# Patient Record
Sex: FEMALE | Race: WHITE | NOT HISPANIC OR LATINO | URBAN - METROPOLITAN AREA
[De-identification: names, ages, dates, MRNs, and addresses within clinical notes are randomized per-mention and may not be internally consistent; named-entity substitution may affect disease eponyms.]

---

## 2018-08-30 ENCOUNTER — OFFICE VISIT CONVERTED (OUTPATIENT)
Dept: OTHER | Facility: HOSPITAL | Age: 73
End: 2018-08-30
Attending: NURSE PRACTITIONER

## 2018-08-30 ENCOUNTER — CONVERSION ENCOUNTER (OUTPATIENT)
Dept: OTHER | Facility: HOSPITAL | Age: 73
End: 2018-08-30

## 2018-09-20 ENCOUNTER — OFFICE VISIT CONVERTED (OUTPATIENT)
Dept: OTHER | Facility: HOSPITAL | Age: 73
End: 2018-09-20
Attending: NURSE PRACTITIONER

## 2019-03-18 ENCOUNTER — OFFICE VISIT CONVERTED (OUTPATIENT)
Dept: OTHER | Facility: HOSPITAL | Age: 74
End: 2019-03-18
Attending: NURSE PRACTITIONER

## 2019-03-18 ENCOUNTER — CONVERSION ENCOUNTER (OUTPATIENT)
Dept: OTHER | Facility: HOSPITAL | Age: 74
End: 2019-03-18

## 2019-03-18 ENCOUNTER — HOSPITAL ENCOUNTER (OUTPATIENT)
Dept: OTHER | Facility: HOSPITAL | Age: 74
Discharge: HOME | End: 2019-03-18
Attending: NURSE PRACTITIONER

## 2019-04-09 ENCOUNTER — CONVERSION ENCOUNTER (OUTPATIENT)
Dept: OTHER | Facility: HOSPITAL | Age: 74
End: 2019-04-09

## 2019-04-09 ENCOUNTER — HOSPITAL ENCOUNTER (OUTPATIENT)
Dept: OTHER | Facility: HOSPITAL | Age: 74
Discharge: HOME | End: 2019-04-09
Attending: NURSE PRACTITIONER

## 2019-04-11 LAB
CONV HEPATITIS C AB WITH REFLEX TO CONFIRMATION: <0.1 S/CO RATIO (ref 0–0.9)
CONV HEPATITIS COMMENT: NORMAL

## 2019-04-24 ENCOUNTER — CONVERSION ENCOUNTER (OUTPATIENT)
Dept: OTHER | Facility: HOSPITAL | Age: 74
End: 2019-04-24

## 2019-04-24 ENCOUNTER — OFFICE VISIT CONVERTED (OUTPATIENT)
Dept: OTHER | Facility: HOSPITAL | Age: 74
End: 2019-04-24
Attending: NURSE PRACTITIONER

## 2019-06-26 ENCOUNTER — OFFICE VISIT CONVERTED (OUTPATIENT)
Dept: OTHER | Facility: HOSPITAL | Age: 74
End: 2019-06-26
Attending: NURSE PRACTITIONER

## 2019-06-26 ENCOUNTER — CONVERSION ENCOUNTER (OUTPATIENT)
Dept: OTHER | Facility: HOSPITAL | Age: 74
End: 2019-06-26

## 2019-07-11 ENCOUNTER — CONVERSION ENCOUNTER (OUTPATIENT)
Dept: SURGERY | Facility: CLINIC | Age: 74
End: 2019-07-11

## 2019-10-16 ENCOUNTER — HOSPITAL ENCOUNTER (OUTPATIENT)
Dept: OTHER | Facility: HOSPITAL | Age: 74
Discharge: HOME | End: 2019-10-16
Attending: NURSE PRACTITIONER

## 2019-10-16 ENCOUNTER — OFFICE VISIT CONVERTED (OUTPATIENT)
Dept: OTHER | Facility: HOSPITAL | Age: 74
End: 2019-10-16
Attending: NURSE PRACTITIONER

## 2019-10-16 ENCOUNTER — CONVERSION ENCOUNTER (OUTPATIENT)
Dept: OTHER | Facility: HOSPITAL | Age: 74
End: 2019-10-16

## 2019-10-16 LAB
25(OH)D3 SERPL-MCNC: 28.4 NG/ML (ref 30–100)
BASOPHILS # BLD AUTO: 0.02 10*3/UL (ref 0–0.2)
BASOPHILS NFR BLD AUTO: 0.3 % (ref 0–3)
CONV ABS IMM GRAN: 0.01 10*3/UL (ref 0–0.2)
CONV IMMATURE GRAN: 0.1 % (ref 0–1.8)
DEPRECATED RDW RBC AUTO: 39.1 FL (ref 36.4–46.3)
EOSINOPHIL # BLD AUTO: 0.2 10*3/UL (ref 0–0.7)
EOSINOPHIL # BLD AUTO: 2.6 % (ref 0–7)
ERYTHROCYTE [DISTWIDTH] IN BLOOD BY AUTOMATED COUNT: 12 % (ref 11.7–14.4)
FOLATE SERPL-MCNC: 19.8 NG/ML (ref 4.8–20)
HCT VFR BLD AUTO: 36.8 % (ref 37–47)
HGB BLD-MCNC: 11.8 G/DL (ref 12–16)
LYMPHOCYTES # BLD AUTO: 1.51 10*3/UL (ref 1–5)
LYMPHOCYTES NFR BLD AUTO: 19.5 % (ref 20–45)
MCH RBC QN AUTO: 28.7 PG (ref 27–31)
MCHC RBC AUTO-ENTMCNC: 32.1 G/DL (ref 33–37)
MCV RBC AUTO: 89.5 FL (ref 81–99)
MONOCYTES # BLD AUTO: 0.45 10*3/UL (ref 0.2–1.2)
MONOCYTES NFR BLD AUTO: 5.8 % (ref 3–10)
NEUTROPHILS # BLD AUTO: 5.57 10*3/UL (ref 2–8)
NEUTROPHILS NFR BLD AUTO: 71.7 % (ref 30–85)
NRBC CBCN: 0 % (ref 0–0.7)
PLATELET # BLD AUTO: 299 10*3/UL (ref 130–400)
PMV BLD AUTO: 9.3 FL (ref 9.4–12.3)
RBC # BLD AUTO: 4.11 10*6/UL (ref 4.2–5.4)
VIT B12 SERPL-MCNC: 284 PG/ML (ref 211–911)
WBC # BLD AUTO: 7.76 10*3/UL (ref 4.8–10.8)

## 2020-06-10 ENCOUNTER — HOSPITAL ENCOUNTER (OUTPATIENT)
Dept: GENERAL RADIOLOGY | Facility: HOSPITAL | Age: 75
Discharge: HOME | End: 2020-06-10
Attending: UROLOGY

## 2020-06-10 ENCOUNTER — HOSPITAL ENCOUNTER (OUTPATIENT)
Dept: LAB | Facility: HOSPITAL | Age: 75
Discharge: HOME | End: 2020-06-10
Attending: UROLOGY

## 2020-06-10 LAB
ANION GAP SERPL CALC-SCNC: 17 MMOL/L (ref 8–19)
BUN SERPL-MCNC: 15 MG/DL (ref 5–25)
BUN/CREAT SERPL: 15 {RATIO} (ref 6–20)
CALCIUM SERPL-MCNC: 9.2 MG/DL (ref 8.7–10.4)
CHLORIDE SERPL-SCNC: 103 MMOL/L (ref 99–111)
CONV CO2: 24 MMOL/L (ref 22–32)
CREAT UR-MCNC: 0.98 MG/DL (ref 0.5–0.9)
GFR SERPLBLD BASED ON 1.73 SQ M-ARVRAT: 56 ML/MIN/{1.73_M2}
GLUCOSE SERPL-MCNC: 87 MG/DL (ref 65–99)
OSMOLALITY SERPL CALC.SUM OF ELEC: 290 MOSM/KG (ref 273–304)
POTASSIUM SERPL-SCNC: 4.1 MMOL/L (ref 3.5–5.3)
SODIUM SERPL-SCNC: 140 MMOL/L (ref 135–147)

## 2020-06-11 ENCOUNTER — OFFICE VISIT CONVERTED (OUTPATIENT)
Dept: UROLOGY | Facility: CLINIC | Age: 75
End: 2020-06-11
Attending: UROLOGY

## 2020-10-23 ENCOUNTER — HOSPITAL ENCOUNTER (OUTPATIENT)
Dept: GENERAL RADIOLOGY | Facility: HOSPITAL | Age: 75
Discharge: HOME | End: 2020-10-23
Attending: UROLOGY

## 2020-10-28 ENCOUNTER — TELEPHONE CONVERTED (OUTPATIENT)
Dept: UROLOGY | Facility: CLINIC | Age: 75
End: 2020-10-28
Attending: UROLOGY

## 2020-11-11 ENCOUNTER — HOSPITAL ENCOUNTER (OUTPATIENT)
Dept: PREADMISSION TESTING | Facility: HOSPITAL | Age: 75
Discharge: HOME | End: 2020-11-11
Attending: UROLOGY

## 2020-11-11 ENCOUNTER — HOSPITAL ENCOUNTER (OUTPATIENT)
Dept: OTHER | Facility: HOSPITAL | Age: 75
Discharge: HOME | End: 2020-11-11
Attending: UROLOGY

## 2020-11-13 LAB
BACTERIA UR CULT: NORMAL
SARS-COV-2 RNA SPEC QL NAA+PROBE: NOT DETECTED

## 2020-11-16 ENCOUNTER — HOSPITAL ENCOUNTER (OUTPATIENT)
Dept: PERIOP | Facility: HOSPITAL | Age: 75
Setting detail: HOSPITAL OUTPATIENT SURGERY
Discharge: HOME | End: 2020-11-16
Attending: UROLOGY

## 2020-11-16 LAB — GLUCOSE BLD-MCNC: 105 MG/DL (ref 65–99)

## 2020-11-18 ENCOUNTER — HOSPITAL ENCOUNTER (OUTPATIENT)
Dept: PREADMISSION TESTING | Facility: HOSPITAL | Age: 75
Discharge: HOME | End: 2020-11-18
Attending: UROLOGY

## 2020-11-20 LAB — SARS-COV-2 RNA SPEC QL NAA+PROBE: NOT DETECTED

## 2020-11-23 ENCOUNTER — HOSPITAL ENCOUNTER (OUTPATIENT)
Dept: PERIOP | Facility: HOSPITAL | Age: 75
Setting detail: HOSPITAL OUTPATIENT SURGERY
Discharge: HOME | End: 2020-11-23
Attending: UROLOGY

## 2020-11-23 LAB
BASOPHILS # BLD AUTO: 0.04 10*3/UL (ref 0–0.2)
BASOPHILS NFR BLD AUTO: 0.5 % (ref 0–3)
CONV ABS IMM GRAN: 0.04 10*3/UL (ref 0–0.2)
CONV IMMATURE GRAN: 0.5 % (ref 0–1.8)
DEPRECATED RDW RBC AUTO: 43.3 FL (ref 36.4–46.3)
EOSINOPHIL # BLD AUTO: 0.16 10*3/UL (ref 0–0.7)
EOSINOPHIL # BLD AUTO: 2 % (ref 0–7)
ERYTHROCYTE [DISTWIDTH] IN BLOOD BY AUTOMATED COUNT: 13.2 % (ref 11.7–14.4)
HCT VFR BLD AUTO: 34.6 % (ref 37–47)
HGB BLD-MCNC: 11.5 G/DL (ref 12–16)
LYMPHOCYTES # BLD AUTO: 1.9 10*3/UL (ref 1–5)
LYMPHOCYTES NFR BLD AUTO: 23.3 % (ref 20–45)
MCH RBC QN AUTO: 29.9 PG (ref 27–31)
MCHC RBC AUTO-ENTMCNC: 33.2 G/DL (ref 33–37)
MCV RBC AUTO: 90.1 FL (ref 81–99)
MONOCYTES # BLD AUTO: 0.27 10*3/UL (ref 0.2–1.2)
MONOCYTES NFR BLD AUTO: 3.3 % (ref 3–10)
NEUTROPHILS # BLD AUTO: 5.75 10*3/UL (ref 2–8)
NEUTROPHILS NFR BLD AUTO: 70.4 % (ref 30–85)
NRBC CBCN: 0 % (ref 0–0.7)
PLATELET # BLD AUTO: 215 10*3/UL (ref 130–400)
PMV BLD AUTO: 8.7 FL (ref 9.4–12.3)
RBC # BLD AUTO: 3.84 10*6/UL (ref 4.2–5.4)
WBC # BLD AUTO: 8.16 10*3/UL (ref 4.8–10.8)

## 2020-12-02 LAB
COLOR STONE: NORMAL
COMPN STONE: NORMAL
CONV CA OXALATE MONOHYDRATE: 100 %
CONV CALCULI COMMENT: NORMAL
CONV CALCULI DISCLAIMER: NORMAL
CONV CALCULI NOTE: NORMAL
CONV PHOTO (CALCULI): NORMAL
SIZE STONE: NORMAL MM
WT STONE: 142 MG

## 2021-01-13 ENCOUNTER — HOSPITAL ENCOUNTER (OUTPATIENT)
Dept: ULTRASOUND IMAGING | Facility: HOSPITAL | Age: 76
Discharge: HOME | End: 2021-01-13
Attending: UROLOGY

## 2021-01-29 ENCOUNTER — TELEPHONE CONVERTED (OUTPATIENT)
Dept: UROLOGY | Facility: CLINIC | Age: 76
End: 2021-01-29
Attending: UROLOGY

## 2021-05-10 NOTE — H&P
History and Physical      Patient Name: Makayla Hood   Patient ID: [id number]   Sex: Female   YOB: 1945    Primary Care Provider: Tonia ELY   Referring Provider: Tonia ELY    Visit Date: October 28, 2020    Provider: Nina Kirkpatrick MD   Location: [facility]   Location Address: [address] [place] [address]  [id number]   Location Phone: [phone]          Chief Complaint  · Outpatient History & Physical / Surgical Orders      History Of Present Illness  [facility]  Outpatient History and Physical Surgical Orders  Preadmission Location: Phone Preadmission Time: 02:00 PM   Which Facility: [facility] Surgery Date: 11/16/2020 Preadmission Testing Date: 11/11/2020   Patient's Name: Makayla Hood YOB: 1945   Chief complaint/history present illness: right ureteral stone   Current Medication List: Cymbalta 60 mg oral capsule,delayed release(DR/EC), estradiol 1 mg oral tablet, hydroxyzine HCl 10 mg oral tablet, lisinopril 5 mg oral tablet, magnesium 250 mg oral tablet, sertraline 100 mg oral tablet, and Vitamin D2 50,000 unit oral capsule   Allergies: phenobarbital and SULFA (SULFONAMIDES)   Significant past medical: Anxiety, Arthritis, Bipolar 1 disorder, depressed, Cataracts, bilateral, Depression, Essential hypertension, Gallstones, Hypoglycemia, Nephrolithiasis, Screening for breast cancer, and Screening for colon cancer   Past Surgical History: Cataract surgery, Cholecstectomy, Colon Resection, Hysterectomy, and Polypectomy   Examination of heart and lungs: Breath sounds normal, no distress, Abdomen soft, non-tender, BSx4 are positive, and Regular rate, no chest retractions         Allergy List    Allergies Reconciled          Assessment  · Pre-Surgical Orders     V72.84  · Ureteral stone     592.1/N20.1  · Pre-op testing     V72.84/Z01.818    Problems Reconciled  Plan  · Orders  o General Urology Surgery  Order ([facility]) - V72.84, 592.1/N20.1 - 11/16/2020  o [facility] Pre-Op Covid-19 Screening (25333) - V72.84/Z01.818 - 11/11/2020  o Urine Culture (Clean Catch) [facility] ([address]) - V72.84/Z01.818 - 11/11/2020  · Medications  o Medications have been Reconciled  o Transition of Care or Provider Policy  · Instructions  o Pre-Operative Orders: Sign permit for cystoscopy, right retrograde, right ureteroscopy, laser lithotripsy, right stent placement.  o Outpatient   o Levaquin 500 mg IV OCTOR.  o RISK AND BENEFITS:  o Possible risks/complications, benefits and alternatives to surgical or invasive procedure have been explained to patient and/or legal guardian.  o Electronically Identified Patient Education Materials Provided Electronically            Electronically Signed by: Nina Kirkpatrick MD -Author on October 28, 2020 05:04:22 PM

## 2021-05-10 NOTE — H&P
History and Physical      Patient Name: Makayla Hood   Patient ID: [id number]   Sex: Female   YOB: 1945    Primary Care Provider: Tonia ELY   Referring Provider: Tonia ELY    Visit Date: June 11, 2020    Provider: Nina Kirkpatrick MD   Location: [facility]   Location Address: [address] [place] [address]  [id number]   Location Phone: [phone]          Chief Complaint  · pt here for urological concerns      History Of Present Illness     74-year-old lady presents for further evaluation of right nephrolithiasis.  Patient has history of significant for complicated diverticulitis requiring urgent surgical intervention via colon resection; patient recounts long complicated hospitalization course including ICU for which she is still recovering from both emotionally and physically.  CT scan obtained by primary after complaint of left upper quadrant pain.  Pain was 4 approximately 3-4 days and was in the epigastric region.  This resulted in an incidental finding of a 9.8 x 6.3 mm right renal pelvis calculi.    Patient denies right-sided flank pain.  She denies any hematuria or dysuria.    Denies history of nephrolithiasis.    Patient somewhat anxious during the encounter and becomes tearful when discussing the possible need for intervention stating that she does not wish to pursueintervention unless absolutely necessary.    Update 6/11/2020: Patient presents for follow-up with KUB prior.  Patient states she is doing well since last visit in July 2019.  Denies any flank pain or hematuria.  No complaints today.  Denies changes to medical history since last visit.      Creatinine   6/20: 0.98  6/19: 0.82    KUB, 6/10/2020: Calcification right quadrant measuring 1.5 cm could relate to previously noted calculus within the right renal pelvis       Past Medical History  Anxiety; Arthritis; Bipolar 1 disorder, depressed; Cataracts, bilateral; Depression; Essential  "hypertension; Gallstones; Hypoglycemia; Nephrolithiasis; Screening for breast cancer; Screening for colon cancer         Past Surgical History  Cataract surgery; Cholecstectomy; Colon Resection; Hysterectomy; Polypectomy         Medication List  Cymbalta 60 mg oral capsule,delayed release(DR/EC); estradiol 1 mg oral tablet; hydroxyzine HCl 10 mg oral tablet; lisinopril 5 mg oral tablet; magnesium 250 mg oral tablet; sertraline 100 mg oral tablet; Vitamin D2 50,000 unit oral capsule         Allergy List  phenobarbital; SULFA (SULFONAMIDES)       Allergies Reconciled  Family Medical History  Cancer, Unspecified; Colon Cancer; Testicular Cancer         Reproductive History   0 Para 0 0 0 0       Social History  Alcohol (Never); ; Recreational Drug Use (Never); Tobacco (Never)         Immunizations  Name Date Admin   Influenza Refused   Influenza    Influenza    [id number]          Review of Systems  · Constitutional  o Denies  o : chills, fever  · Gastrointestinal  o Denies  o : nausea, vomiting, flank pain      Vitals  Date Time BP Position Site L\R Cuff Size HR RR TEMP (F) WT  HT  BMI kg/m2 BSA m2 O2 Sat        2020 01:45 PM       15  136lbs 8oz 5'  3\" 24.18 1.66           Physical Examination  · Constitutional  o Appearance  o : Well developed, well nourished, alert, in no acute distress.  · Head and Face  o Head  o :   § Inspection  § : Normocephalic, atraumatic  o Face  o :   § Inspection  § : No facial lesions  · Respiratory  o Respiratory Effort  o : Breathing is unlabored without accessory muscle use  o Inspection of Chest  o : Normal appearance, no retractions  · Cardiovascular  o Heart  o : normal rate   · Gastrointestinal  o Abdominal Examination  o : Abdomen appears soft and nondistended  · Skin and Subcutaneous Tissue  o General Inspection  o : No rashes, lesions or areas of discoloration present. Skin turgor is normal.  · Neurologic  o Mental Status Examination  o : "   § Orientation  § : alert and oriented x 3  o Gait and Station  o :   § Gait Screening  § : Ambulating wiithout difficulty  · Psychiatric  o Mood and Affect  o : mood normal, affect appropriate          Results     [facility] Diagnostic Img      PACS RADIOLOGY REPORT    Patient: PRETTY WILSON Acct: #[id number] Report: #[id number]    UNIT #: [id number]  DOS: 06/10/20 1200  INSURANCE:[employer] ORDER #:[id number]  LOCATION:[facility]   : 1945    PROVIDERS  ADMITTING:   ATTENDING: ALEK LYLES  FAMILY:  ELADIO MANZO ORDERING:  ALEK LYLES   OTHER:  DICTATING:  Maynor Salomon MD    REQ #:[id number]   EXAM:ABD - ABDOMEN SINGLE AP  REASON FOR EXAM:    REASON FOR VISIT:  NEPHROLITHIASIS    *******Signed******     PROCEDURE: ABDOMEN SINGLE AP     COMPARISON: Other, CT, ABDOMEN/PELVIS WITH CONTRAST, 2019, 21:41.     INDICATIONS: NEPHROLITHIASIS. NO ABDOMEN COMPLAINTS TODAY.     FINDINGS:   BOWEL GAS PATTERN: Normal.  No abnormal dilation or deviation.    CALCIFICATIONS: There is a prominent calcification in the right upper quadrant measuring 1.5 cm   which could relate to prior renal calculus in the area of the renal pelvis.  There are additional   calcifications left upper quadrant that on prior CT appear to relate to the spleen.  OTHER: Negative.  No abnormal gaseous collections.       CONCLUSION:   1. Calcification right quadrant that could relate to previously noted calculus within the right   renal pelvis.            MAYNOR SALOMON MD         Electronically Signed and Approved By: MAYNOR SALOMON MD on 6/10/2020 at 13:19            Until signed, this is an unconfirmed preliminary report that may contain  errors and is subject to change.                [facility]:  D:06/10/20 1319         Assessment  · Nephrolithiasis     592.0/N20.0    Problems Reconciled  Plan  · Medications  o Medications have been Reconciled  o Transition of Care or Provider  Policy  · Instructions  o Electronically Identified Patient Education Materials Provided Electronically     Doing well since last visit; patient asymptomatic from right renal pelvis stone  Discussed AUA guidelines of stone management which indicates that stones can safely be monitored up to 2 cm  Patient continues to wish to avoid intervention if possible    Continued monitoring  Follow-up in 1 year with BMP and CT renal stone protocol prior  All questions addressed    Total time for encounter greater than 15 minutes due to review of records, counseling and coordination of care which dominated greater than 51% of the encounter.               Electronically Signed by: Nina Kirkpatrick MD -Author on July 18, 2020 02:21:52 PM

## 2021-05-13 NOTE — PROGRESS NOTES
Progress Note      Patient Name: Makayla Hood   Patient ID: [id number]   Sex: Female   YOB: 1945    Primary Care Provider: Tonia ELY   Referring Provider: Tonia ELY    Visit Date: October 28, 2020    Provider: Nina Kirkpatrick MD   Location: [facility]   Location Address: [address] [place] [address]  [id number]   Location Phone: [phone]          Chief Complaint  · pt here for urological concerns     Telephone Visit- presents for evaluation via telephone.   Verbal consent obtained before beginning visit.   The following staff were present during this visit: Anjelica East LPN  Total time for encounter: 11 minutes       History Of Present Illness     74-year-old lady presents for further evaluation of right nephrolithiasis.  Patient has history of significant for complicated diverticulitis requiring urgent surgical intervention via colon resection; patient recounts long complicated hospitalization course including ICU for which she is still recovering from both emotionally and physically.  CT scan obtained by primary after complaint of left upper quadrant pain.  Pain was 4 approximately 3-4 days and was in the epigastric region.  This resulted in an incidental finding of a 9.8 x 6.3 mm right renal pelvis calculi.    Patient denies right-sided flank pain.  She denies any hematuria or dysuria.    Denies history of nephrolithiasis.    Patient somewhat anxious during the encounter and becomes tearful when discussing the possible need for intervention stating that she does not wish to pursueintervention unless absolutely necessary.    Update 6/11/2020: Patient presents for follow-up with KUB prior.  Patient states she is doing well since last visit in July 2019.  Denies any flank pain or hematuria.  No complaints today.  Denies changes to medical history since last visit.    Update 10/28/2020: Patient presents for follow-up after calling due to  "complaints of bladder pressure.  Had a sensation like something was \"in there.\"  Patient states she only had that sensation for about a day and it spontaneously went away.  Denies associated burning or hematuria.  Had CT scan for further evaluation prior to today's visit.      Creatinine   : 0.98  : 0.82    KUB, 6/10/2020: Calcification right quadrant measuring 1.5 cm could relate to previously noted calculus within the right renal pelvis    CONCLUSION:   1. Increase in size of the large stone within the right renal pelvis now measuring up to 15 mm in   size.  Similar appearing surrounding inflammation of the right renal pelvis.  No associated   obstruction.  2. Additional ancillary findings as detailed above.           Past Medical History  Anxiety; Arthritis; Bipolar 1 disorder, depressed; Cataracts, bilateral; Depression; Essential hypertension; Gallstones; Hypoglycemia; Nephrolithiasis; Screening for breast cancer; Screening for colon cancer         Past Surgical History  Cataract surgery; Cholecstectomy; Colon Resection; Hysterectomy; Polypectomy         Medication List  Cymbalta 60 mg oral capsule,delayed release(DR/EC); estradiol 1 mg oral tablet; hydroxyzine HCl 10 mg oral tablet; lisinopril 5 mg oral tablet; magnesium 250 mg oral tablet; sertraline 100 mg oral tablet; Vitamin D2 50,000 unit oral capsule         Allergy List  phenobarbital; SULFA (SULFONAMIDES)       Allergies Reconciled  Family Medical History  Cancer, Unspecified; Colon Cancer; Testicular Cancer         Reproductive History   0 Para 0 0 0 0       Social History  Alcohol (Never); ; Recreational Drug Use (Never); Tobacco (Never)         Review of Systems  · Constitutional  o Denies  o : chills, fever  · Gastrointestinal  o Denies  o : nausea, vomiting          Results       [facility]         [place] [facility]      PACS RADIOLOGY REPORT    Patient: PRETTY WILSON Acct: #[id number] Report: " #[id number]    UNIT #: [id number]  DOS: 10/23/20 1415  INSURANCE:[employer] ORDER #:[id number]  LOCATION:[facility]   : 1945    PROVIDERS  ADMITTING:   ATTENDING: ALEK LYLES  FAMILY:  ELADIO MANZO ORDERING:  ALEK LYLES   OTHER:  DICTATING:  PAZ MIRANDA MD    REQ #:[id number]   EXAM:ABDPELWO - CT ABDOMEN PELVIS wo CONTRAST  REASON FOR EXAM:    REASON FOR VISIT:  NEPHROLITHIASIS    *******Signed******     PROCEDURE: CT ABDOMEN PELVIS WITHOUT CONTRAST     COMPARISON: Other, CT, ABDOMEN/PELVIS WITH CONTRAST, 2019, 21:41.     INDICATIONS: PATIENT STATES KNOWN RT KIDNEY STONE, F/U, NO COMPLAINTS     TECHNIQUE: CT images were created without intravenous contrast.       PROTOCOL:   Standard imaging protocol performed      RADIATION:   DLP: 382.4mGy*cm    Automated exposure control was utilized to minimize radiation dose.      FINDINGS:   The heart size is normal.  There is no pericardial effusion.  There is coronary artery   atherosclerotic calcification.  There are calcified granulomas within the right lung base.  The   liver is normal in size and contour.  There are scattered calcified granulomas within the liver and   spleen.  The gallbladder is surgically absent.  There is stable dilation of the extrahepatic common   bile duct.  The spleen is normal in size.  There are dystrophic calcifications within both adrenal   glands, stable from the prior examination and likely sequelae of prior adrenal hemorrhage or   infection.  The pancreas appears within normal limits for noncontrast technique.     There is a large oval stone measuring 1.5 cm within the right renal pelvis.  There is mild adjacent   inflammation of the right renal pelvis which appears similar to the prior examination.  There are   no additional renal stones identified.  There is no hydronephrosis or hydroureter.  Urinary bladder   is fluid filled without wall thickening.  The uterus is surgically absent.  There are  no adnexal   masses.     The stomach and duodenum are normal in caliber and configuration.  There are no abnormally dilated   or thickened loops of small bowel to suggest small bowel obstruction or small bowel inflammation.    There is evidence of prior bowel resection within the right lower quadrant.  There is fluid   throughout the colon which may suggest underlying diarrhea.  There is sigmoid diverticulosis   without acute diverticulitis.  The aorta is normal in caliber without evidence of aneurysm   formation.  There is no mesenteric, retroperitoneal, or pelvic lymphadenopathy by size criteria.    There is chondrocalcinosis at the pubic symphysis.  There is grade 2 anterolisthesis of L5 on S1   with bilateral pars defects and endplate sclerosis.  There is a fat containing epigastric hernia.     CONCLUSION:   1. Increase in size of the large stone within the right renal pelvis now measuring up to 15 mm in   size.  Similar appearing surrounding inflammation of the right renal pelvis.  No associated   obstruction.  2. Additional ancillary findings as detailed above.              PAZ MIRANDA MD         Electronically Signed and Approved By: PAZ MIRANDA MD on 10/23/2020 at 15:54                     Until signed, this is an unconfirmed preliminary report that may contain  errors and is subject to change.                [facility]:  D:10/23/20 1554         Assessment  · Nephrolithiasis     592.0/N20.0    Problems Reconciled  Plan  · Orders  o Physician Telephone Evaluation, 11-20 minutes ([id number]) - 592.0/N20.0 - 10/28/2020  · Medications  o Medications have been Reconciled  o Transition of Care or Provider Policy  · Instructions  o Electronically Identified Patient Education Materials Provided Electronically     CT scan imaging reviewed, discussed with patient at length.  No findings to suggest etiology of patient's bladder symptoms.  Symptoms spontaneously resolved and have not returned.  Discussed that right  renal pelvis stone is now 1.5 cm, discussed proceeding with continued conservative management surveillance versus definitive stone management.  Patient agreeable to definitive stone management for treatment of stone at this time.    Schedule cystoscopy, right retrograde pyelogram, ureteroscopy, laser lithotripsy, and stent insertion.  Patient aware that given the size of the right renal pelvis stone as well as the location that she may require more than 1 trip to the operating room for completion of stone procedure.  Risks, benefits and alternatives were discussed with patient including bleeding, infection, damage to surrounding structures, stone recurrence, stricture.  Patient also notes understanding that if unable to reach the level of the stone or if significant purulent discharge noted upon initiation of procedure that stent will be placed in definitive stone management will be deferred until the collecting system is optimized.    Will schedule.   All question addressed at this time.             Electronically Signed by: Nina Kirkpatrick MD -Author on October 28, 2020 12:51:40 PM

## 2021-05-14 NOTE — PROGRESS NOTES
Progress Note      Patient Name: Makayla Hood   Patient ID: [id number]   Sex: Female   YOB: 1945    Primary Care Provider: Tonia ELY   Referring Provider: Tonia ELY    Visit Date: January 29, 2021    Provider: Nina Kirkpatrick MD   Location: [facility]   Location Address: [address] [place] [address]  [id number]   Location Phone: [phone]          Chief Complaint  · pt here for urological concerns     Telephone Visit- presents for evaluation via telephone.   Verbal consent obtained before beginning visit.   The following staff were present during this visit: Anjelica East LPN  Total time for encounter: 11 minutes       History Of Present Illness     74-year-old lady presents for further evaluation of right nephrolithiasis.  Patient has history of significant for complicated diverticulitis requiring urgent surgical intervention via colon resection; patient recounts long complicated hospitalization course including ICU for which she is still recovering from both emotionally and physically.  CT scan obtained by primary after complaint of left upper quadrant pain.  Pain was 4 approximately 3-4 days and was in the epigastric region.  This resulted in an incidental finding of a 9.8 x 6.3 mm right renal pelvis calculi.    Patient denies right-sided flank pain.  She denies any hematuria or dysuria.    Denies history of nephrolithiasis.    Patient somewhat anxious during the encounter and becomes tearful when discussing the possible need for intervention stating that she does not wish to pursueintervention unless absolutely necessary.    Update 6/11/2020: Patient presents for follow-up with KUB prior.  Patient states she is doing well since last visit in July 2019.  Denies any flank pain or hematuria.  No complaints today.  Denies changes to medical history since last visit.    Update 10/28/2020: Patient presents for follow-up after calling due to  "complaints of bladder pressure.  Had a sensation like something was \"in there.\"  Patient states she only had that sensation for about a day and it spontaneously went away.  Denies associated burning or hematuria.  Had CT scan for further evaluation prior to today's visit.    Update 1/29/2021: Patient presents for follow-up after right ureteroscopy laser lithotripsy and stent with subsequent second look OR ureteroscopy, basket extraction and stent.  Patient removed stent as instructed postoperatively.  Now doing well.  Denies flank pain or hematuria.  No complaints today.    ______________________  Chemical stone analysis, 12/2/2020: 100% calcium oxalate    Renal ultrasound, 1/13/2021: Mild distention of the right renal pelvis without definite calyceal dilatation no hydronephrosis at this time; no calculus identified.    Creatinine   6/20: 0.98  6/19: 0.82    KUB, 6/10/2020: Calcification right quadrant measuring 1.5 cm could relate to previously noted calculus within the right renal pelvis    CONCLUSION:   1. Increase in size of the large stone within the right renal pelvis now measuring up to 15 mm in   size.  Similar appearing surrounding inflammation of the right renal pelvis.  No associated   obstruction.  2. Additional ancillary findings as detailed above.           Past Medical History  Anxiety; Arthritis; Bipolar 1 disorder, depressed; Cataracts, bilateral; Depression; Essential hypertension; Gallstones; Hypoglycemia; Nephrolithiasis; Screening for breast cancer; Screening for colon cancer         Past Surgical History  Cataract surgery; Cholecstectomy; Colon Resection; Hysterectomy; Polypectomy         Medication List  Cymbalta 60 mg oral capsule,delayed release(DR/EC); estradiol 1 mg oral tablet; hydroxyzine HCl 10 mg oral tablet; lisinopril 5 mg oral tablet; magnesium 250 mg oral tablet; sertraline 100 mg oral tablet; Vitamin D2 50,000 unit oral capsule         Allergy List  phenobarbital; SULFA " (SULFONAMIDES)       Allergies Reconciled  Family Medical History  Cancer, Unspecified; Colon Cancer; Testicular Cancer         Reproductive History   0 Para 0 0 0 0       Social History  Alcohol (Never); ; Recreational Drug Use (Never); Tobacco (Never)         Immunizations  Name Date Admin   Influenza Refused   Influenza 2018   Influenza 10/01/2018   [id number] 2013         Review of Systems  · Constitutional  o Denies  o : fever, chills  · Gastrointestinal  o Denies  o : nausea, vomiting          Results       [facility]      PACS RADIOLOGY REPORT    Patient: PRETTY WILSON Acct: #[id number] Report: #[id number]    UNIT #: [id number]  DOS: 21 1233  INSURANCE:[employer] CHOICE ORDER #: [id number]  LOCATION:   : 1945    PROVIDERS  ADMITTING:   ATTENDING: ALEK LYLES  FAMILY:  NONE,MD ORDERING:  ALEK LYLES   OTHER:  DICTATING:  Faustino Umana MD    REQ #:[id number]   EXAM:CRETR - COMPLETE RETROPERI MARLENA KIDNEYS  REASON FOR EXAM:  RT NEPHROLYTHIASIS  REASON FOR VISIT:  RT NEPHROLYTHIASIS    *******Signed******     PROCEDURE: U/S BILATERAL  KIDNEYS     COMPARISON: [facility], CT, ABD PEL W/O CONTRAST, 10/23/2020, 14:25.  [facility], CR, UROGRAPHY/RETROGRADE W/WO KUB, 2020, 7:47.     INDICATIONS: RT NEPHROLYTHIASIS     TECHNIQUE: Ultrasound examination of the kidneys and bladder was performed.       FINDINGS:   The right kidney measures 8.7 x 4.5 x 4.6 cm. The left kidney measures 9 x 4.5 x 3.8 cm.  The   kidneys demonstrate normal echogenicity.  There appears to be mild distention of the right renal   pelvis.  There does not appear to be significant caliceal dilatation on this exam.  No left   hydronephrosis is seen.  No definite echogenic renal calculus or suspicious renal lesion is seen.    The bladder is mildly distended.  No focal bladder abnormality is seen.   Bilateral ureteral jets   were noted.     CONCLUSION:   1. Mild distention of the right renal pelvis without definite calyceal dilatation indicate   hydronephrosis at this time.  2. No calculus was identified and bilateral ureteral jets were visualized indicating ureteral   patency.            FELIPE DOWNEY MD         Electronically Signed and Approved By: FELIPE DOWNEY MD on 1/13/2021 at 12:58                     Until signed, this is an unconfirmed preliminary report that may contain  errors and is subject to change.                NETMA:  D:01/13/21 1258         Assessment  · Calcium oxalate calculus     275.49/E83.59    Problems Reconciled  Plan  · Orders  o Physician Telephone Evaluation, 11-20 minutes ([id number], [id number]) - 275.49/E83.59 - 01/29/2021  · Medications  o Medications have been Reconciled  o Transition of Care or Provider Policy  · Instructions  o Electronically Identified Patient Education Materials Provided Electronically     Renal ultrasound imaging reviewed and discussed with patient at length, no hydronephrosis, or evidence of residual stone.  No further intervention necessary at this time. Consider patient stone free at this time.   Right renal pelvis with some mild distentiondiscussed with patient that this is of unknown clinical significance.  Recommend surveillance imaging in about 6 months.  She is agreeable.    Calcium oxalate stone- Discussed dietary modifications for prevention of stone growth and recurrence including increased hydration, decrease sodium, normal calcium, low animal protein diet.    Informational handouts to be mailed to patient  All questions addressed    Follow-up 6 months with renal ultrasound and BMP prior  All questions addressed               Electronically Signed by: Nina Kirkpatrick MD -Author on January 29, 2021 01:34:49 PM

## 2021-05-15 VITALS — HEIGHT: 63 IN | BODY MASS INDEX: 24.19 KG/M2 | WEIGHT: 136.5 LBS | RESPIRATION RATE: 15 BRPM

## 2021-05-15 VITALS
OXYGEN SATURATION: 98 % | WEIGHT: 136.37 LBS | TEMPERATURE: 97.5 F | HEIGHT: 63 IN | BODY MASS INDEX: 24.16 KG/M2 | RESPIRATION RATE: 16 BRPM | SYSTOLIC BLOOD PRESSURE: 112 MMHG | HEART RATE: 97 BPM | DIASTOLIC BLOOD PRESSURE: 60 MMHG

## 2021-05-15 VITALS — BODY MASS INDEX: 23.57 KG/M2 | RESPIRATION RATE: 14 BRPM | WEIGHT: 133 LBS | HEIGHT: 63 IN

## 2021-05-15 VITALS
OXYGEN SATURATION: 99 % | WEIGHT: 137 LBS | BODY MASS INDEX: 24.27 KG/M2 | RESPIRATION RATE: 18 BRPM | SYSTOLIC BLOOD PRESSURE: 100 MMHG | DIASTOLIC BLOOD PRESSURE: 64 MMHG | HEART RATE: 100 BPM | TEMPERATURE: 96.8 F | HEIGHT: 63 IN

## 2021-05-15 VITALS
TEMPERATURE: 97.5 F | DIASTOLIC BLOOD PRESSURE: 78 MMHG | RESPIRATION RATE: 16 BRPM | HEART RATE: 90 BPM | SYSTOLIC BLOOD PRESSURE: 160 MMHG | HEIGHT: 63 IN | OXYGEN SATURATION: 97 % | BODY MASS INDEX: 24.1 KG/M2 | WEIGHT: 136 LBS

## 2021-05-15 VITALS
RESPIRATION RATE: 18 BRPM | TEMPERATURE: 98 F | WEIGHT: 136 LBS | BODY MASS INDEX: 24.1 KG/M2 | OXYGEN SATURATION: 99 % | SYSTOLIC BLOOD PRESSURE: 128 MMHG | DIASTOLIC BLOOD PRESSURE: 60 MMHG | HEIGHT: 63 IN | HEART RATE: 118 BPM

## 2021-05-15 VITALS
HEART RATE: 100 BPM | OXYGEN SATURATION: 100 % | SYSTOLIC BLOOD PRESSURE: 114 MMHG | TEMPERATURE: 97.1 F | WEIGHT: 138 LBS | BODY MASS INDEX: 24.45 KG/M2 | RESPIRATION RATE: 16 BRPM | DIASTOLIC BLOOD PRESSURE: 60 MMHG | HEIGHT: 63 IN

## 2021-05-16 VITALS
HEART RATE: 98 BPM | BODY MASS INDEX: 25.16 KG/M2 | OXYGEN SATURATION: 97 % | RESPIRATION RATE: 16 BRPM | SYSTOLIC BLOOD PRESSURE: 100 MMHG | DIASTOLIC BLOOD PRESSURE: 78 MMHG | TEMPERATURE: 97.5 F | HEIGHT: 63 IN | WEIGHT: 142 LBS

## 2021-05-16 VITALS
SYSTOLIC BLOOD PRESSURE: 148 MMHG | HEIGHT: 63 IN | OXYGEN SATURATION: 97 % | TEMPERATURE: 97.1 F | RESPIRATION RATE: 18 BRPM | WEIGHT: 145 LBS | BODY MASS INDEX: 25.69 KG/M2 | DIASTOLIC BLOOD PRESSURE: 82 MMHG | HEART RATE: 95 BPM

## 2024-10-25 ENCOUNTER — HOSPITAL ENCOUNTER (EMERGENCY)
Dept: OTHER | Facility: HOSPITAL | Age: 79
Discharge: HOME | End: 2024-10-25
Payer: MEDICARE

## 2024-10-25 ENCOUNTER — APPOINTMENT (OUTPATIENT)
Dept: CT IMAGING | Facility: HOSPITAL | Age: 79
End: 2024-10-25
Payer: MEDICARE

## 2024-10-25 ENCOUNTER — HOSPITAL ENCOUNTER (EMERGENCY)
Facility: HOSPITAL | Age: 79
Discharge: HOME | End: 2024-10-25
Attending: EMERGENCY MEDICINE
Payer: MEDICARE

## 2024-10-25 VITALS
TEMPERATURE: 98.6 F | BODY MASS INDEX: 24.49 KG/M2 | HEART RATE: 90 BPM | WEIGHT: 138.23 LBS | OXYGEN SATURATION: 96 % | RESPIRATION RATE: 18 BRPM | HEIGHT: 63 IN | DIASTOLIC BLOOD PRESSURE: 83 MMHG | SYSTOLIC BLOOD PRESSURE: 114 MMHG

## 2024-10-25 DIAGNOSIS — R19.7 DIARRHEA, UNSPECIFIED TYPE: ICD-10-CM

## 2024-10-25 DIAGNOSIS — K52.9 ENTERITIS: Primary | ICD-10-CM

## 2024-10-25 LAB
ALBUMIN SERPL-MCNC: 4.1 G/DL (ref 3.5–5.2)
ALBUMIN/GLOB SERPL: 1.1 G/DL
ALP SERPL-CCNC: 77 U/L (ref 39–117)
ALT SERPL W P-5'-P-CCNC: 14 U/L (ref 1–33)
ANION GAP SERPL CALCULATED.3IONS-SCNC: 13.1 MMOL/L (ref 5–15)
AST SERPL-CCNC: 20 U/L (ref 1–32)
BACTERIA UR QL AUTO: ABNORMAL /HPF
BASOPHILS # BLD AUTO: 0.04 10*3/MM3 (ref 0–0.2)
BASOPHILS NFR BLD AUTO: 0.4 % (ref 0–1.5)
BILIRUB SERPL-MCNC: 0.6 MG/DL (ref 0–1.2)
BILIRUB UR QL STRIP: NEGATIVE
BUN SERPL-MCNC: 10 MG/DL (ref 8–23)
BUN/CREAT SERPL: 10 (ref 7–25)
CALCIUM SPEC-SCNC: 10 MG/DL (ref 8.6–10.5)
CHLORIDE SERPL-SCNC: 105 MMOL/L (ref 98–107)
CLARITY UR: ABNORMAL
CO2 SERPL-SCNC: 19.9 MMOL/L (ref 22–29)
COLOR UR: YELLOW
CREAT SERPL-MCNC: 1 MG/DL (ref 0.57–1)
D-LACTATE SERPL-SCNC: 2 MMOL/L (ref 0.5–2)
DEPRECATED RDW RBC AUTO: 43.3 FL (ref 37–54)
EGFRCR SERPLBLD CKD-EPI 2021: 57.4 ML/MIN/1.73
EOSINOPHIL # BLD AUTO: 0.23 10*3/MM3 (ref 0–0.4)
EOSINOPHIL NFR BLD AUTO: 2 % (ref 0.3–6.2)
ERYTHROCYTE [DISTWIDTH] IN BLOOD BY AUTOMATED COUNT: 13.2 % (ref 12.3–15.4)
GLOBULIN UR ELPH-MCNC: 3.6 GM/DL
GLUCOSE SERPL-MCNC: 109 MG/DL (ref 65–99)
GLUCOSE UR STRIP-MCNC: NEGATIVE MG/DL
HCT VFR BLD AUTO: 47.1 % (ref 34–46.6)
HGB BLD-MCNC: 15.4 G/DL (ref 12–15.9)
HGB UR QL STRIP.AUTO: NEGATIVE
HOLD SPECIMEN: NORMAL
HOLD SPECIMEN: NORMAL
HYALINE CASTS UR QL AUTO: ABNORMAL /LPF
IMM GRANULOCYTES # BLD AUTO: 0.03 10*3/MM3 (ref 0–0.05)
IMM GRANULOCYTES NFR BLD AUTO: 0.3 % (ref 0–0.5)
KETONES UR QL STRIP: NEGATIVE
LEUKOCYTE ESTERASE UR QL STRIP.AUTO: ABNORMAL
LIPASE SERPL-CCNC: 66 U/L (ref 13–60)
LYMPHOCYTES # BLD AUTO: 3.3 10*3/MM3 (ref 0.7–3.1)
LYMPHOCYTES NFR BLD AUTO: 29 % (ref 19.6–45.3)
MCH RBC QN AUTO: 29.3 PG (ref 26.6–33)
MCHC RBC AUTO-ENTMCNC: 32.7 G/DL (ref 31.5–35.7)
MCV RBC AUTO: 89.7 FL (ref 79–97)
MONOCYTES # BLD AUTO: 0.67 10*3/MM3 (ref 0.1–0.9)
MONOCYTES NFR BLD AUTO: 5.9 % (ref 5–12)
NEUTROPHILS NFR BLD AUTO: 62.4 % (ref 42.7–76)
NEUTROPHILS NFR BLD AUTO: 7.11 10*3/MM3 (ref 1.7–7)
NITRITE UR QL STRIP: NEGATIVE
NRBC BLD AUTO-RTO: 0 /100 WBC (ref 0–0.2)
PH UR STRIP.AUTO: 6 [PH] (ref 5–8)
PLATELET # BLD AUTO: 239 10*3/MM3 (ref 140–450)
PMV BLD AUTO: 9.1 FL (ref 6–12)
POTASSIUM SERPL-SCNC: 3.4 MMOL/L (ref 3.5–5.2)
PROT SERPL-MCNC: 7.7 G/DL (ref 6–8.5)
PROT UR QL STRIP: ABNORMAL
RBC # BLD AUTO: 5.25 10*6/MM3 (ref 3.77–5.28)
RBC # UR STRIP: ABNORMAL /HPF
REF LAB TEST METHOD: ABNORMAL
SODIUM SERPL-SCNC: 138 MMOL/L (ref 136–145)
SP GR UR STRIP: 1.02 (ref 1–1.03)
SQUAMOUS #/AREA URNS HPF: ABNORMAL /HPF
UROBILINOGEN UR QL STRIP: ABNORMAL
WBC # UR STRIP: ABNORMAL /HPF
WBC NRBC COR # BLD AUTO: 11.38 10*3/MM3 (ref 3.4–10.8)
WHOLE BLOOD HOLD COAG: NORMAL
WHOLE BLOOD HOLD SPECIMEN: NORMAL

## 2024-10-25 PROCEDURE — 99285 EMERGENCY DEPT VISIT HI MDM: CPT

## 2024-10-25 PROCEDURE — 83690 ASSAY OF LIPASE: CPT

## 2024-10-25 PROCEDURE — 81001 URINALYSIS AUTO W/SCOPE: CPT | Performed by: EMERGENCY MEDICINE

## 2024-10-25 PROCEDURE — 74177 CT ABD & PELVIS W/CONTRAST: CPT

## 2024-10-25 PROCEDURE — 85025 COMPLETE CBC W/AUTO DIFF WBC: CPT | Performed by: EMERGENCY MEDICINE

## 2024-10-25 PROCEDURE — 25510000001 IOPAMIDOL PER 1 ML

## 2024-10-25 PROCEDURE — 83605 ASSAY OF LACTIC ACID: CPT

## 2024-10-25 PROCEDURE — 80053 COMPREHEN METABOLIC PANEL: CPT

## 2024-10-25 PROCEDURE — 36415 COLL VENOUS BLD VENIPUNCTURE: CPT

## 2024-10-25 RX ORDER — DICYCLOMINE HYDROCHLORIDE 10 MG/1
10 CAPSULE ORAL 3 TIMES DAILY PRN
Qty: 12 CAPSULE | Refills: 0 | Status: SHIPPED | OUTPATIENT
Start: 2024-10-25

## 2024-10-25 RX ORDER — LEVOTHYROXINE SODIUM 25 UG/1
25 TABLET ORAL
COMMUNITY
Start: 2024-09-09

## 2024-10-25 RX ORDER — ONDANSETRON 4 MG/1
4 TABLET, ORALLY DISINTEGRATING ORAL 4 TIMES DAILY PRN
Qty: 10 TABLET | Refills: 0 | Status: SHIPPED | OUTPATIENT
Start: 2024-10-25

## 2024-10-25 RX ORDER — PREDNISONE 10 MG/1
10 TABLET ORAL DAILY
COMMUNITY
Start: 2024-06-27

## 2024-10-25 RX ORDER — CIPROFLOXACIN 500 MG/1
500 TABLET, FILM COATED ORAL EVERY 12 HOURS
Qty: 6 TABLET | Refills: 0 | Status: SHIPPED | OUTPATIENT
Start: 2024-10-25

## 2024-10-25 RX ORDER — DULOXETIN HYDROCHLORIDE 60 MG/1
60 CAPSULE, DELAYED RELEASE ORAL DAILY
COMMUNITY

## 2024-10-25 RX ORDER — VENLAFAXINE HYDROCHLORIDE 75 MG/1
75 CAPSULE, EXTENDED RELEASE ORAL DAILY
COMMUNITY

## 2024-10-25 RX ORDER — SODIUM CHLORIDE 0.9 % (FLUSH) 0.9 %
10 SYRINGE (ML) INJECTION AS NEEDED
Status: DISCONTINUED | OUTPATIENT
Start: 2024-10-25 | End: 2024-10-26 | Stop reason: HOSPADM

## 2024-10-25 RX ORDER — LISINOPRIL 5 MG/1
5 TABLET ORAL DAILY
COMMUNITY
Start: 2024-09-09

## 2024-10-25 RX ORDER — MECLIZINE HYDROCHLORIDE 25 MG/1
25 TABLET ORAL 3 TIMES DAILY PRN
COMMUNITY

## 2024-10-25 RX ORDER — METRONIDAZOLE 500 MG/1
500 TABLET ORAL 2 TIMES DAILY
Qty: 6 TABLET | Refills: 0 | Status: SHIPPED | OUTPATIENT
Start: 2024-10-25 | End: 2024-10-28

## 2024-10-25 RX ORDER — SERTRALINE HYDROCHLORIDE 100 MG/1
100 TABLET, FILM COATED ORAL DAILY
COMMUNITY

## 2024-10-25 RX ORDER — ESTRADIOL 1 MG/1
1 TABLET ORAL DAILY
COMMUNITY
Start: 2024-09-17

## 2024-10-25 RX ORDER — IOPAMIDOL 755 MG/ML
100 INJECTION, SOLUTION INTRAVASCULAR
Status: COMPLETED | OUTPATIENT
Start: 2024-10-25 | End: 2024-10-25

## 2024-10-25 RX ADMIN — IOPAMIDOL 80 ML: 755 INJECTION, SOLUTION INTRAVENOUS at 22:50

## 2024-10-26 NOTE — ED PROVIDER NOTES
Time: 9:31 PM EDT  Date of encounter:  10/25/2024  Independent Historian/Clinical History and Information was obtained by:   Patient    History is limited by: N/A    Chief Complaint   Patient presents with    Abdominal Pain         History of Present Illness:  Patient is a 79 y.o. year old female who presents to the emergency department for evaluation of abdominal pain.  She reports that for the past 24 hours she has had abdominal pain all over her abdomen accompanied by nausea.  She reports she is having extreme amounts of diarrhea, denies blood in her stool.  States that she had a KUB performed with her primary doctor and stated that her bowel was distended.  She has had previous bowel surgeries secondary to polyps and complications from that.  She has had portions of her colon removed back in the early 2000's.  She denies any recent antibiotics.  She endorses nausea, no vomiting.  She denies fever, chills, leg swelling, chest pain, shortness of breath.  (Provider in triage, Aida MAXWELL)    Patient Care Team  Primary Care Provider: Tonia Torres APRN    Past Medical History:     Allergies   Allergen Reactions    Phenobarbital Headache    Sulfa Antibiotics GI Intolerance     History reviewed. No pertinent past medical history.  No past surgical history on file.  History reviewed. No pertinent family history.    Home Medications:  Prior to Admission medications    Not on File        Social History:          Review of Systems:  Review of Systems   Constitutional:  Positive for activity change and appetite change. Negative for chills, fatigue and fever.   HENT:  Negative for congestion.    Cardiovascular:  Negative for chest pain.   Gastrointestinal:  Positive for abdominal pain, diarrhea and nausea. Negative for vomiting.   Musculoskeletal:  Negative for back pain.   Skin:  Negative for color change.        Physical Exam:  /83 (BP Location: Right arm, Patient Position: Sitting)   Pulse 90    "Temp 98.6 °F (37 °C) (Oral)   Resp 18   Ht 160 cm (63\")   Wt 62.7 kg (138 lb 3.7 oz)   SpO2 96%   BMI 24.49 kg/m²         Physical Exam  HENT:      Head: Normocephalic.      Mouth/Throat:      Mouth: Mucous membranes are moist.   Eyes:      Pupils: Pupils are equal, round, and reactive to light.   Pulmonary:      Effort: Pulmonary effort is normal.   Abdominal:      General: There is no distension.      Palpations: Abdomen is soft.      Tenderness: There is generalized abdominal tenderness.   Musculoskeletal:      Cervical back: Neck supple.   Skin:     General: Skin is warm and dry.   Neurological:      General: No focal deficit present.      Mental Status: She is alert and oriented to person, place, and time.   Psychiatric:         Mood and Affect: Mood normal.         Behavior: Behavior normal.                Procedures:  Procedures      Medical Decision Making:      Comorbidities that affect care:    History of chronic bronchitis    External Notes reviewed:    Previous Clinic Note: Outpatient pulmonology visit 8/15/2024      The following orders were placed and all results were independently analyzed by me:  Orders Placed This Encounter   Procedures    CT Abdomen Pelvis With Contrast    XR Outside Films    [facility] Draw    Comprehensive Metabolic Panel    Lipase    Urinalysis With Microscopic If Indicated (No Culture) - Urine, Clean Catch    Lactic Acid, Plasma    CBC Auto Differential    Urinalysis, Microscopic Only - Urine, Clean Catch    Undress & Gown    CBC & Differential    Green Top (Gel)    Lavender Top    Gold Top - SST    Light Blue Top       Medications Given in the Emergency Department:  Medications   iopamidol (ISOVUE-370) 76 % injection 100 mL (80 mL Intravenous Given 10/25/24 2250)        ED Course:    The patient was initially evaluated in the triage area where orders were placed. The patient was later dispositioned by Bon Gamboa MD.      The patient was advised to stay for completion of " workup which includes but is not limited to communication of labs and radiological results, reassessment and plan. The patient was advised that leaving prior to disposition by a provider could result in critical findings that are not communicated to the patient.     ED Course as of 10/26/24 0734   Fri Oct 25, 2024   2131 PROVIDER IN TRIAGE  Patient was evaluated by me in triage Aida Bill PA-C.  Orders are placed and patient is currently awaiting final results and disposition.   [AS]      ED Course User Index  [AS] Aida Bill PA-C       Labs:    Lab Results (last 24 hours)       Procedure Component Value Units Date/Time    CBC & Differential [[id number]]  (Abnormal) Collected: 10/25/24 2143    Specimen: Blood from Arm, Left Updated: 10/25/24 2153    Narrative:      The following orders were created for panel order CBC & Differential.  Procedure                               Abnormality         Status                     ---------                               -----------         ------                     CBC Auto Differential[[id number]]        Abnormal            Final result                 Please view results for these tests on the individual orders.    Comprehensive Metabolic Panel [[id number]]  (Abnormal) Collected: 10/25/24 2143    Specimen: Blood from Arm, Left Updated: 10/25/24 2212     Glucose 109 mg/dL      BUN 10 mg/dL      Creatinine 1.00 mg/dL      Sodium 138 mmol/L      Potassium 3.4 mmol/L      Chloride 105 mmol/L      CO2 19.9 mmol/L      Calcium 10.0 mg/dL      Total Protein 7.7 g/dL      Albumin 4.1 g/dL      ALT (SGPT) 14 U/L      AST (SGOT) 20 U/L      Alkaline Phosphatase 77 U/L      Total Bilirubin 0.6 mg/dL      Globulin 3.6 gm/dL      A/G Ratio 1.1 g/dL      BUN/Creatinine Ratio 10.0     Anion Gap 13.1 mmol/L      eGFR 57.4 mL/min/1.73     Narrative:      GFR Normal >60  Chronic Kidney Disease <60  Kidney Failure <15    The GFR formula is only valid for adults with stable renal  function between ages 18 and 70.    Lipase [[id number]]  (Abnormal) Collected: 10/25/24 2143    Specimen: Blood from Arm, Left Updated: 10/25/24 2212     Lipase 66 U/L     Lactic Acid, Plasma [[id number]]  (Normal) Collected: 10/25/24 2143    Specimen: Blood from Arm, Left Updated: 10/25/24 2211     Lactate 2.0 mmol/L     CBC Auto Differential [[id number]]  (Abnormal) Collected: 10/25/24 2143    Specimen: Blood from Arm, Left Updated: 10/25/24 2153     WBC 11.38 10*3/mm3      RBC 5.25 10*6/mm3      Hemoglobin 15.4 g/dL      Hematocrit 47.1 %      MCV 89.7 fL      MCH 29.3 pg      MCHC 32.7 g/dL      RDW 13.2 %      RDW-SD 43.3 fl      MPV 9.1 fL      Platelets 239 10*3/mm3      Neutrophil % 62.4 %      Lymphocyte % 29.0 %      Monocyte % 5.9 %      Eosinophil % 2.0 %      Basophil % 0.4 %      Immature Grans % 0.3 %      Neutrophils, Absolute 7.11 10*3/mm3      Lymphocytes, Absolute 3.30 10*3/mm3      Monocytes, Absolute 0.67 10*3/mm3      Eosinophils, Absolute 0.23 10*3/mm3      Basophils, Absolute 0.04 10*3/mm3      Immature Grans, Absolute 0.03 10*3/mm3      nRBC 0.0 /100 WBC     Urinalysis With Microscopic If Indicated (No Culture) - Urine, Clean Catch [[id number]]  (Abnormal) Collected: 10/25/24 2212    Specimen: Urine, Clean Catch Updated: 10/25/24 2223     Color, UA Yellow     Appearance, UA Cloudy     pH, UA 6.0     Specific Gravity, UA 1.016     Glucose, UA Negative     Ketones, UA Negative     Bilirubin, UA Negative     Blood, UA Negative     Protein, UA 30 mg/dL (1+)     Leuk Esterase, UA Large (3+)     Nitrite, UA Negative     Urobilinogen, UA 0.2 E.U./dL    Urinalysis, Microscopic Only - Urine, Clean Catch [[id number]]  (Abnormal) Collected: 10/25/24 2212    Specimen: Urine, Clean Catch Updated: 10/25/24 2244     RBC, UA 0-2 /HPF      WBC, UA 11-20 /HPF      Bacteria, UA 1+ /HPF      Squamous Epithelial Cells, UA 7-12 /HPF      Hyaline Casts, UA 0-2 /LPF      Methodology Manual Light Microscopy              Imaging:    CT Abdomen Pelvis With Contrast    Result Date: 10/25/2024  CT ABDOMEN PELVIS W CONTRAST Date of Exam: 10/25/2024 10:38 PM EDT Indication: generalized abd pain and diarrhea. Comparison: CT abdomen pelvis 10/23/2020 Technique: Axial CT images were obtained of the abdomen and pelvis after the uneventful intravenous administration of iodinated contrast. Reconstructed coronal and sagittal images were also obtained. Automated exposure control and iterative construction methods were used. Findings: Lung Bases: Bibasal atelectasis coronary calcifications calcified mediastinal lymph nodes, likely sequelae of prior granulomatous disease. Liver: Calcified granulomas  Gallbladder and biliary tree: Cholecystectomy  Spleen: Calcified granulomas  Pancreas: No significant abnormality.  Adrenal glands: Bilateral adrenal gland calcifications are nonspecific but may be related to prior trauma or sequelae of prior infection.  Kidneys and ureters: No significant abnormality.  Stomach and duodenum:No significant abnormality. Small and large bowel: Fluid levels in the large bowel compatible which can be seen with diarrheal state. No evidence of bowel obstruction. Postsurgical changes of prior bowel resection/likely right hemicolectomy. There is mild fat stranding/inflammatory  change in the lower abdomen adjacent to small bowel. Peritoneal cavity: No free fluid or free air. Bladder: No significant abnormality.  Pelvic organs: Hysterectomy  Vasculature: Atherosclerotic calcifications.  Lymph nodes: No pathologic appearing lymph nodes by imaging criteria.  Bones and soft tissues: Degenerative changes of the imaged spine. Bilateral L5 pars interarticularis defects with grade 2 spondylolisthesis. No acute osseous abnormality. Small fat-containing anterior abdominal wall and umbilical hernias.     Impression: Mild fat stranding/inflammatory change adjacent to small bowel loops in the lower abdomen most suggestive of enteritis.  Fluid levels in the large bowel which can be seen in diarrheal state. Electronically Signed: Leander Hollis  10/25/2024 11:03 PM EDT  Workstation ID: [id number]    XR Outside Films    Result Date: 10/25/2024  This procedure was auto-finalized with no dictation required.       Differential Diagnosis and Discussion:      Diarrhea: Differential diagnosis includes but is not limited to malabsorption syndrome, bacterial infection, carcinoid syndrome, pancreatic hypersecretion, viral infection, celiac sprue, Crohn's disease, ulcerative colitis, ischemic colitis, colitis, hypermotility, and irritable bowel syndrome.    All labs were reviewed and interpreted by me.  CT scan radiology impression was interpreted by me.    MDM                   Patient Care Considerations:    NARCOTICS: I considered prescribing opiate pain medication as an outpatient, however no pain control required in the emergency department      Consultants/Shared Management Plan:    None    Social Determinants of Health:    Patient has presented with family members who are responsible, reliable and will ensure follow up care.      Disposition and Care Coordination:    Discharged: The patient is suitable and stable for discharge with no need for consideration of admission.    I have explained the patient´s condition, diagnoses and treatment plan based on the information available to me at this time. I have answered questions and addressed any concerns. The patient has a good  understanding of the patient´s diagnosis, condition, and treatment plan as can be expected at this point. The vital signs have been stable. The patient´s condition is stable and appropriate for discharge from the emergency department.      The patient will pursue further outpatient evaluation with the primary care physician or other designated or consulting physician as outlined in the discharge instructions. They are agreeable to this plan of care and follow-up instructions have been  explained in detail. The patient has received these instructions in written format and has expressed an understanding of the discharge instructions. The patient is aware that any significant change in condition or worsening of symptoms should prompt an immediate return to this or the closest emergency department or call to 911.  I have explained discharge medications and the need for follow up with the patient/caretakers. This was also printed in the discharge instructions. Patient was discharged with the following medications and follow up:      Medication List        New Prescriptions      ciprofloxacin 500 MG tablet  Commonly known as: CIPRO  Take 1 tablet by mouth Every 12 (Twelve) Hours.     dicyclomine 10 MG capsule  Commonly known as: BENTYL  Take 1 capsule by mouth 3 (Three) Times a Day As Needed for Abdominal Cramping.     metroNIDAZOLE 500 MG tablet  Commonly known as: FLAGYL  Take 1 tablet by mouth 2 (Two) Times a Day for 3 days.     ondansetron ODT 4 MG disintegrating tablet  Commonly known as: ZOFRAN-ODT  Place 1 tablet on the tongue 4 (Four) Times a Day As Needed for Nausea.               Where to Get Your Medications        These medications were sent to [facility]/pharmacy #[id number] - [place], [place] - [address] - [phone]  - [phone] FX  [address] [place] [address]      Hours: 24-hours Phone: [phone]   ciprofloxacin 500 MG tablet  dicyclomine 10 MG capsule  metroNIDAZOLE 500 MG tablet  ondansetron ODT 4 MG disintegrating tablet      Tonia Torres APRN  [address] [facility]  [address] [place] [id number] [phone]    Schedule an appointment as soon as possible for a visit          Final diagnoses:   Enteritis   Diarrhea, unspecified type        ED Disposition       ED Disposition   Discharge    Condition   Stable    Comment   --               This medical record created using voice recognition software.             Bon Gamboa MD  10/26/24 0279